# Patient Record
Sex: MALE | Race: WHITE | NOT HISPANIC OR LATINO | Employment: OTHER | URBAN - METROPOLITAN AREA
[De-identification: names, ages, dates, MRNs, and addresses within clinical notes are randomized per-mention and may not be internally consistent; named-entity substitution may affect disease eponyms.]

---

## 2023-03-23 ENCOUNTER — LAB (OUTPATIENT)
Dept: LAB | Facility: HOSPITAL | Age: 68
End: 2023-03-23
Payer: MEDICARE

## 2023-03-23 ENCOUNTER — OFFICE VISIT (OUTPATIENT)
Dept: GASTROENTEROLOGY | Facility: CLINIC | Age: 68
End: 2023-03-23
Payer: MEDICARE

## 2023-03-23 VITALS — BODY MASS INDEX: 27.77 KG/M2 | HEIGHT: 70 IN | WEIGHT: 194 LBS

## 2023-03-23 DIAGNOSIS — K74.69 OTHER CIRRHOSIS OF LIVER: Primary | ICD-10-CM

## 2023-03-23 DIAGNOSIS — D12.2 ADENOMATOUS POLYP OF ASCENDING COLON: ICD-10-CM

## 2023-03-23 DIAGNOSIS — K74.69 OTHER CIRRHOSIS OF LIVER: ICD-10-CM

## 2023-03-23 DIAGNOSIS — I85.10 SECONDARY ESOPHAGEAL VARICES WITHOUT BLEEDING: ICD-10-CM

## 2023-03-23 DIAGNOSIS — Z86.19 HISTORY OF HEPATITIS C: ICD-10-CM

## 2023-03-23 LAB
INR PPP: 1.21 (ref 0.84–1.13)
PROTHROMBIN TIME: 15.2 SECONDS (ref 11.4–14.4)

## 2023-03-23 PROCEDURE — 1159F MED LIST DOCD IN RCRD: CPT | Performed by: INTERNAL MEDICINE

## 2023-03-23 PROCEDURE — 82105 ALPHA-FETOPROTEIN SERUM: CPT | Performed by: INTERNAL MEDICINE

## 2023-03-23 PROCEDURE — 80053 COMPREHEN METABOLIC PANEL: CPT

## 2023-03-23 PROCEDURE — 85025 COMPLETE CBC W/AUTO DIFF WBC: CPT | Performed by: INTERNAL MEDICINE

## 2023-03-23 PROCEDURE — 36415 COLL VENOUS BLD VENIPUNCTURE: CPT | Performed by: INTERNAL MEDICINE

## 2023-03-23 PROCEDURE — 1160F RVW MEDS BY RX/DR IN RCRD: CPT | Performed by: INTERNAL MEDICINE

## 2023-03-23 PROCEDURE — 85610 PROTHROMBIN TIME: CPT | Performed by: INTERNAL MEDICINE

## 2023-03-23 PROCEDURE — 99204 OFFICE O/P NEW MOD 45 MIN: CPT | Performed by: INTERNAL MEDICINE

## 2023-03-23 RX ORDER — BUSPIRONE HYDROCHLORIDE 10 MG/1
10 TABLET ORAL 3 TIMES DAILY
COMMUNITY

## 2023-03-23 RX ORDER — FERROUS SULFATE 325(65) MG
325 TABLET ORAL
COMMUNITY

## 2023-03-23 RX ORDER — PANTOPRAZOLE SODIUM 40 MG/1
40 TABLET, DELAYED RELEASE ORAL DAILY
COMMUNITY

## 2023-03-23 RX ORDER — SODIUM ZIRCONIUM CYCLOSILICATE 10 G/10G
10 POWDER, FOR SUSPENSION ORAL
COMMUNITY

## 2023-03-23 RX ORDER — ZOLPIDEM TARTRATE 10 MG/1
5 TABLET ORAL NIGHTLY PRN
COMMUNITY

## 2023-03-23 RX ORDER — AMLODIPINE BESYLATE 10 MG/1
10 TABLET ORAL DAILY
COMMUNITY

## 2023-03-23 RX ORDER — FUROSEMIDE 80 MG
80 TABLET ORAL 2 TIMES DAILY
COMMUNITY

## 2023-03-23 RX ORDER — CARVEDILOL 12.5 MG/1
TABLET ORAL
COMMUNITY
Start: 2023-03-20

## 2023-03-23 RX ORDER — GLYBURIDE 5 MG/1
5 TABLET ORAL
COMMUNITY

## 2023-03-23 NOTE — PROGRESS NOTES
PCP:  Provider, No Known     Marck Cueva, [address] [address]    Chief Complaint   Patient presents with   • Cirrhosis     New pt. Cirrhosis        HPI   The patient is a 67-year-old with a history of cirrhosis who has moved from [place].  He presumably has cirrhosis from history of hepatitis C which has been treated and eradicated.  As a young man he was a heavy drinker as well.  He has had a previous gastroenterologist who has followed him closely.  He has never had a large GI bleed.  He does have dark stools at times.  He has coughed up a little blood but clearly was coughing and not vomiting.  He has had an extensive GI work-up several times in the past.  This is included multiple upper endoscopies as well as colonoscopies.  He also has had a capsule endoscopy.  He is overdue for ultrasound of the liver as well as blood work.  He is overdue for an alpha-fetoprotein.  It sounds like he may be overdue for both an upper endoscopy to evaluate his varices as well as a colonoscopy given his history of polyps.  He has even had an ERCP in the past on 12/17/2018 and had a common duct stone removed.  He has no family history of colon polyps or cancers.  He does have a history of kidney stones, cholecystectomy for stones, tonsillectomy, diabetes, renal failure, hypertension, and hepatitis C as mentioned.  He also has a history of cirrhosis.  He denies any confusion or difficulties with his thinking.  He has had ascites in the past.  He is on Lasix twice daily.    He did have an ERCP as mentioned 12/17/2018 for stones.  It looks like he had an upper and lower endoscopy in February 2019.  This showed esophageal varices which were small.  They flattened with insufflation.  Colonoscopy showed a small sessile polyp in the ascending colon that was removed.  Biopsies in the stomach showed gastritis.  There was no H. pylori.  Biopsies in the colon showed hyperplastic polyp.      He had a  repeat EGD on 4/23/2021.  He had 3 columns of grade 1-2 varices.  One of them had a mucosal erosion but was not actively bleeding.  He had a Schatzki's ring.  He he did elect to place 3 bands at that time.  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  He had a repeat EGD 8/30/2021.  The varices were thought to be small and flattened with insufflation.  There was scarring from the previous banding.  He had a widely patent Schatzki's ring.  Again there was some striping in the antrum thought to possibly represent gastric antral vascular ectasia.  He had a colonoscopy 9/17/2021 which showed a 20 mm polyp in the ascending colon removed in a piecemeal fashion.  There were 3 small sessile polyps in the rectum.  There was a sigmoid polyp removed as well.  The rectal polyps were hyperplastic.  The sigmoid polyp was a tubulovillous adenoma.  The right colon polyp was a sessile serrated polyp.  He had a capsule endoscopy 11/22/2021 which showed a few punctate areas of erythema scattered in the small bowel.  There was no active bleeding.  The colonoscopy showed small sessile polyps in the sigmoid colon.  It looks like he wanted to do a repeat colonoscopy in a year from his last evaluation.  The polyps in 2021 were hyperplastic.  He does have an appointment at [facility] April [facility].    Allergies   Allergen Reactions   • Biaxin [Clarithromycin] Hives          Current Outpatient Medications:   •  amLODIPine (NORVASC) 10 MG tablet, Take 1 tablet by mouth Daily., Disp: , Rfl:   •  busPIRone (BUSPAR) 10 MG tablet, Take 1 tablet by mouth 3 (Three) Times a Day., Disp: , Rfl:   •  carvedilol (COREG) 12.5 MG tablet, , Disp: , Rfl:   •  ferrous sulfate 325 (65 FE) MG tablet, Take 1 tablet by mouth Daily With Breakfast., Disp: , Rfl:   •  furosemide (LASIX) 80 MG tablet, Take 1 tablet by mouth 2 (Two) Times a Day., Disp: , Rfl:   •  glyburide (DIAbeta) 5 MG tablet, Take 1  tablet by mouth Daily With Breakfast., Disp: , Rfl:   •  pantoprazole (PROTONIX) 40 MG EC tablet, Take 1 tablet by mouth Daily., Disp: , Rfl:   •  sodium zirconium cyclosilicate (Lokelma) 10 g pack, Take 10 g by mouth., Disp: , Rfl:   •  zolpidem (AMBIEN) 10 MG tablet, Take 5 mg by mouth At Night As Needed for Sleep., Disp: , Rfl:      Past Medical History:   Diagnosis Date   • Cirrhosis (HCC)    • Diabetes mellitus (HCC)    • Hyperlipidemia    • Hypertension        Past Surgical History:   Procedure Laterality Date   • CHOLECYSTECTOMY     • TONSILLECTOMY          Social History     Socioeconomic History   • Marital status:    Tobacco Use   • Smoking status: Never   Substance and Sexual Activity   • Alcohol use: Never   • Drug use: Never        History reviewed. No pertinent family history.     Review of Systems     There were no vitals filed for this visit.     Physical Exam  Constitutional:       General: He is not in acute distress.     Appearance: Normal appearance. He is not ill-appearing.   Abdominal:      General: There is no distension.      Palpations: Abdomen is soft. There is no mass.      Tenderness: There is no abdominal tenderness. There is no guarding.   Skin:     Coloration: Skin is not jaundiced.      Findings: No rash.   Neurological:      General: No focal deficit present.      Mental Status: He is alert and oriented to person, place, and time.     I do not see any spider angiomas or palmar erythema.  There is no asterixis.  There is no obvious hepatic encephalopathy.    Diagnoses and all orders for this visit:    1. Other cirrhosis of liver (HCC) (Primary)  -     Protime-INR  -     CBC & Differential  -     Comprehensive Metabolic Panel; Future  -     AFP Tumor Marker    2. Adenomatous polyp of ascending colon    3. Secondary esophageal varices without bleeding (HCC)    4. History of hepatitis C    Impressions and plan #1 cirrhosis: He seems to be stable at the moment.  I will check blood  work to get an idea of his liver chemistries, albumin, PT, and platelet count.  We will look at his hemoglobin and hematocrit as well.  I will check an alpha-fetoprotein.  I am going to check an ultrasound of the right upper quadrant.  Ultimately he is likely going to need an upper endoscopy.  He may well need a colonoscopy as well.  I like to get the blood work first as he is somewhat complicated and I want to get a better feel before jumping into any procedures.  He seems to be quite stable and has no signs of encephalopathy.    Pieter Lundberg MD

## 2023-03-24 LAB
ALBUMIN SERPL-MCNC: 3.5 G/DL (ref 3.5–5.2)
ALBUMIN/GLOB SERPL: 1.1 G/DL
ALP SERPL-CCNC: 175 U/L (ref 39–117)
ALPHA-FETOPROTEIN: 4.13 NG/ML (ref 0–8.3)
ALT SERPL W P-5'-P-CCNC: 20 U/L (ref 1–41)
ANION GAP SERPL CALCULATED.3IONS-SCNC: 13 MMOL/L (ref 5–15)
AST SERPL-CCNC: 29 U/L (ref 1–40)
BASOPHILS # BLD AUTO: 0.07 10*3/MM3 (ref 0–0.2)
BASOPHILS NFR BLD AUTO: 1.2 % (ref 0–1.5)
BILIRUB SERPL-MCNC: 0.3 MG/DL (ref 0–1.2)
BUN SERPL-MCNC: 67 MG/DL (ref 8–23)
BUN/CREAT SERPL: 19.5 (ref 7–25)
CALCIUM SPEC-SCNC: 8 MG/DL (ref 8.6–10.5)
CHLORIDE SERPL-SCNC: 113 MMOL/L (ref 98–107)
CO2 SERPL-SCNC: 20 MMOL/L (ref 22–29)
CREAT SERPL-MCNC: 3.44 MG/DL (ref 0.76–1.27)
DEPRECATED RDW RBC AUTO: 43.7 FL (ref 37–54)
EGFRCR SERPLBLD CKD-EPI 2021: 19 ML/MIN/1.73
EOSINOPHIL # BLD AUTO: 0.82 10*3/MM3 (ref 0–0.4)
EOSINOPHIL NFR BLD AUTO: 13.5 % (ref 0.3–6.2)
ERYTHROCYTE [DISTWIDTH] IN BLOOD BY AUTOMATED COUNT: 13.4 % (ref 12.3–15.4)
GLOBULIN UR ELPH-MCNC: 3.2 GM/DL
GLUCOSE SERPL-MCNC: 118 MG/DL (ref 65–99)
HCT VFR BLD AUTO: 24.5 % (ref 37.5–51)
HGB BLD-MCNC: 8.3 G/DL (ref 13–17.7)
IMM GRANULOCYTES # BLD AUTO: 0.02 10*3/MM3 (ref 0–0.05)
IMM GRANULOCYTES NFR BLD AUTO: 0.3 % (ref 0–0.5)
LYMPHOCYTES # BLD AUTO: 0.71 10*3/MM3 (ref 0.7–3.1)
LYMPHOCYTES NFR BLD AUTO: 11.7 % (ref 19.6–45.3)
MCH RBC QN AUTO: 30.2 PG (ref 26.6–33)
MCHC RBC AUTO-ENTMCNC: 33.9 G/DL (ref 31.5–35.7)
MCV RBC AUTO: 89.1 FL (ref 79–97)
MONOCYTES # BLD AUTO: 0.45 10*3/MM3 (ref 0.1–0.9)
MONOCYTES NFR BLD AUTO: 7.4 % (ref 5–12)
NEUTROPHILS NFR BLD AUTO: 3.99 10*3/MM3 (ref 1.7–7)
NEUTROPHILS NFR BLD AUTO: 65.9 % (ref 42.7–76)
NRBC BLD AUTO-RTO: 0 /100 WBC (ref 0–0.2)
PLATELET # BLD AUTO: 132 10*3/MM3 (ref 140–450)
PMV BLD AUTO: 12.2 FL (ref 6–12)
POTASSIUM SERPL-SCNC: 4.5 MMOL/L (ref 3.5–5.2)
PROT SERPL-MCNC: 6.7 G/DL (ref 6–8.5)
RBC # BLD AUTO: 2.75 10*6/MM3 (ref 4.14–5.8)
SODIUM SERPL-SCNC: 146 MMOL/L (ref 136–145)
WBC NRBC COR # BLD: 6.06 10*3/MM3 (ref 3.4–10.8)

## 2023-05-01 ENCOUNTER — HOSPITAL ENCOUNTER (OUTPATIENT)
Dept: ULTRASOUND IMAGING | Facility: HOSPITAL | Age: 68
Discharge: HOME | End: 2023-05-01
Admitting: INTERNAL MEDICINE
Payer: MEDICARE

## 2023-05-01 DIAGNOSIS — K74.69 OTHER CIRRHOSIS OF LIVER: ICD-10-CM

## 2023-05-01 PROCEDURE — 76705 ECHO EXAM OF ABDOMEN: CPT

## 2023-06-06 ENCOUNTER — OFFICE VISIT (OUTPATIENT)
Dept: GASTROENTEROLOGY | Facility: CLINIC | Age: 68
End: 2023-06-06
Payer: MEDICARE

## 2023-06-06 VITALS
BODY MASS INDEX: 28.63 KG/M2 | SYSTOLIC BLOOD PRESSURE: 140 MMHG | WEIGHT: 200 LBS | HEART RATE: 48 BPM | RESPIRATION RATE: 18 BRPM | DIASTOLIC BLOOD PRESSURE: 52 MMHG | HEIGHT: 70 IN

## 2023-06-06 DIAGNOSIS — D12.2 ADENOMATOUS POLYP OF ASCENDING COLON: ICD-10-CM

## 2023-06-06 DIAGNOSIS — Z86.19 HISTORY OF HEPATITIS C: ICD-10-CM

## 2023-06-06 DIAGNOSIS — I85.10 SECONDARY ESOPHAGEAL VARICES WITHOUT BLEEDING: ICD-10-CM

## 2023-06-06 DIAGNOSIS — K74.69 OTHER CIRRHOSIS OF LIVER: Primary | ICD-10-CM

## 2023-06-06 PROCEDURE — 99214 OFFICE O/P EST MOD 30 MIN: CPT | Performed by: INTERNAL MEDICINE

## 2023-06-06 PROCEDURE — 1160F RVW MEDS BY RX/DR IN RCRD: CPT | Performed by: INTERNAL MEDICINE

## 2023-06-06 PROCEDURE — 1159F MED LIST DOCD IN RCRD: CPT | Performed by: INTERNAL MEDICINE

## 2023-06-06 RX ORDER — GLIPIZIDE 5 MG/1
TABLET, FILM COATED, EXTENDED RELEASE ORAL
COMMUNITY
Start: 2023-05-25

## 2023-06-06 NOTE — PROGRESS NOTES
PCP:  Pieter Paula MD     No referring provider defined for this encounter.    Chief Complaint   Patient presents with    Cirrhosis     Follow up Cirrhosis        HPI   ***    Allergies   Allergen Reactions    Biaxin [Clarithromycin] Hives          Current Outpatient Medications:     glipizide (GLUCOTROL XL) 5 MG ER tablet, TAKE 1 TABLET BY MOUTH IN THE MORNING 30 MINUTES BEFORE A MEAL, Disp: , Rfl:     amLODIPine (NORVASC) 10 MG tablet, Take 1 tablet by mouth Daily., Disp: , Rfl:     busPIRone (BUSPAR) 10 MG tablet, Take 1 tablet by mouth 3 (Three) Times a Day., Disp: , Rfl:     carvedilol (COREG) 12.5 MG tablet, , Disp: , Rfl:     ferrous sulfate 325 (65 FE) MG tablet, Take 1 tablet by mouth Daily With Breakfast., Disp: , Rfl:     furosemide (LASIX) 80 MG tablet, Take 1 tablet by mouth 2 (Two) Times a Day., Disp: , Rfl:     glyburide (DIAbeta) 5 MG tablet, Take 1 tablet by mouth Daily With Breakfast., Disp: , Rfl:     pantoprazole (PROTONIX) 40 MG EC tablet, Take 1 tablet by mouth Daily., Disp: , Rfl:     sodium zirconium cyclosilicate (Lokelma) 10 g pack, Take 10 g by mouth., Disp: , Rfl:     zolpidem (AMBIEN) 10 MG tablet, Take 5 mg by mouth At Night As Needed for Sleep., Disp: , Rfl:      Past Medical History:   Diagnosis Date    Cirrhosis     Diabetes mellitus     Hyperlipidemia     Hypertension        Past Surgical History:   Procedure Laterality Date    CHOLECYSTECTOMY      TONSILLECTOMY          Social History     Socioeconomic History    Marital status:    Tobacco Use    Smoking status: Never   Substance and Sexual Activity    Alcohol use: Never    Drug use: Never        No family history on file.     Review of Systems     Vitals:    06/06/23 1404   BP: 140/52   Pulse: (!) 48   Resp: 18        Physical Exam     There are no diagnoses linked to this encounter.     Lilliana Lei MA

## 2023-06-06 NOTE — PROGRESS NOTES
PCP:  Pieter Paula MD     No referring provider defined for this encounter.    Chief Complaint   Patient presents with    Cirrhosis     Follow up Cirrhosis        HPI   The patient is a 67-year-old gentleman known to me with a history of cirrhosis.  He is following concurrently with [facility].  He had a recent alpha-fetoprotein level which was normal.  This was on 4/4/2023.  He had a hepatitis viral RNA by PCR which was negative.  He had a colonoscopy 4/4/2023 which showed hyperplastic polyps.  He had an upper endoscopy as well which he states showed watermelon stomach and fairly well eradicated varices but I do not have that report.  He had an ultrasound done which showed a coarsened echotexture of the liver and enlarged spleen but no mass that I can determine.  He apparently is been diagnosed with renal insufficiency and is seeing a nephrologist Dr. Corral at [facility].    Allergies   Allergen Reactions    Biaxin [Clarithromycin] Hives          Current Outpatient Medications:     glipizide (GLUCOTROL XL) 5 MG ER tablet, TAKE 1 TABLET BY MOUTH IN THE MORNING 30 MINUTES BEFORE A MEAL, Disp: , Rfl:     amLODIPine (NORVASC) 10 MG tablet, Take 1 tablet by mouth Daily., Disp: , Rfl:     busPIRone (BUSPAR) 10 MG tablet, Take 1 tablet by mouth 3 (Three) Times a Day., Disp: , Rfl:     carvedilol (COREG) 12.5 MG tablet, , Disp: , Rfl:     ferrous sulfate 325 (65 FE) MG tablet, Take 1 tablet by mouth Daily With Breakfast., Disp: , Rfl:     furosemide (LASIX) 80 MG tablet, Take 1 tablet by mouth 2 (Two) Times a Day., Disp: , Rfl:     glyburide (DIAbeta) 5 MG tablet, Take 1 tablet by mouth Daily With Breakfast., Disp: , Rfl:     pantoprazole (PROTONIX) 40 MG EC tablet, Take 1 tablet by mouth Daily., Disp: , Rfl:     sodium zirconium cyclosilicate (Lokelma) 10 g pack, Take 10 g by mouth., Disp: , Rfl:     zolpidem (AMBIEN) 10 MG tablet, Take 5 mg by mouth At Night As Needed for Sleep., Disp: , Rfl:      Past Medical  History:   Diagnosis Date    Cirrhosis     Diabetes mellitus     Hyperlipidemia     Hypertension        Past Surgical History:   Procedure Laterality Date    CHOLECYSTECTOMY      TONSILLECTOMY          Social History     Socioeconomic History    Marital status:    Tobacco Use    Smoking status: Never   Substance and Sexual Activity    Alcohol use: Never    Drug use: Never        History reviewed. No pertinent family history.     Review of Systems     Vitals:    06/06/23 1404   BP: 140/52   Pulse: (!) 48   Resp: 18        Physical Exam     Impression diagnoses and all orders for this visit:    1. Other cirrhosis of liver (Primary)    2. Adenomatous polyp of ascending colon    3. Secondary esophageal varices without bleeding    4. History of hepatitis C    Impressions and plan #1 cirrhosis of liver: He had imaging and an alpha-fetoprotein level.  He has been followed at .  I told him that he probably does need to have both of us following at this point but I am available for him at any time.  I will make a follow-up in 6 months although he can cancel if he is having close follow-up at .    #2 history of hepatitis C: It looks like his hepatitis C has been eradicated.  This is excellent news.    #3 history of adenomatous polyps of the colon: He had a recent colonoscopy and only hyperplastic polyps removed.  This was done 4/21/2023.      #4 history of varices and apparently watermelon stomach: He follows with  from that standpoint but is is doing well from that point of view      #5 history of renal insufficiency: He is being considered for dialysis.      Pieter Lundberg MD

## 2023-06-06 NOTE — LETTER
June 6, 2023       No Recipients    Patient: Dayron Pinto   YOB: 1955   Date of Visit: 6/6/2023       Dear Dr. Amador Recipients:    Thank you for referring Dayron Pinto to me for evaluation. Below are the relevant portions of my assessment and plan of care.    If you have questions, please do not hesitate to call me. I look forward to following Dayron along with you.         Sincerely,        Peiter Lundberg MD        CC:   No Recipients    Pieter Lundberg MD  06/06/23 1436  Sign when Signing Visit  PCP:  Pieter Paula MD     No referring provider defined for this encounter.    Chief Complaint   Patient presents with   • Cirrhosis     Follow up Cirrhosis        HPI   The patient is a 67-year-old gentleman known to me with a history of cirrhosis.  He is following concurrently with [facility].  He had a recent alpha-fetoprotein level which was normal.  This was on 4/4/2023.  He had a hepatitis viral RNA by PCR which was negative.  He had a colonoscopy 4/4/2023 which showed hyperplastic polyps.  He had an upper endoscopy as well which he states showed watermelon stomach and fairly well eradicated varices but I do not have that report.  He had an ultrasound done which showed a coarsened echotexture of the liver and enlarged spleen but no mass that I can determine.  He apparently is been diagnosed with renal insufficiency and is seeing a nephrologist Dr. Corral at [facility].    Allergies   Allergen Reactions   • Biaxin [Clarithromycin] Hives          Current Outpatient Medications:   •  glipizide (GLUCOTROL XL) 5 MG ER tablet, TAKE 1 TABLET BY MOUTH IN THE MORNING 30 MINUTES BEFORE A MEAL, Disp: , Rfl:   •  amLODIPine (NORVASC) 10 MG tablet, Take 1 tablet by mouth Daily., Disp: , Rfl:   •  busPIRone (BUSPAR) 10 MG tablet, Take 1 tablet by mouth 3 (Three) Times a Day., Disp: , Rfl:   •  carvedilol (COREG) 12.5 MG tablet, , Disp: , Rfl:   •  ferrous sulfate 325 (65 FE) MG tablet, Take 1  tablet by mouth Daily With Breakfast., Disp: , Rfl:   •  furosemide (LASIX) 80 MG tablet, Take 1 tablet by mouth 2 (Two) Times a Day., Disp: , Rfl:   •  glyburide (DIAbeta) 5 MG tablet, Take 1 tablet by mouth Daily With Breakfast., Disp: , Rfl:   •  pantoprazole (PROTONIX) 40 MG EC tablet, Take 1 tablet by mouth Daily., Disp: , Rfl:   •  sodium zirconium cyclosilicate (Lokelma) 10 g pack, Take 10 g by mouth., Disp: , Rfl:   •  zolpidem (AMBIEN) 10 MG tablet, Take 5 mg by mouth At Night As Needed for Sleep., Disp: , Rfl:      Past Medical History:   Diagnosis Date   • Cirrhosis    • Diabetes mellitus    • Hyperlipidemia    • Hypertension        Past Surgical History:   Procedure Laterality Date   • CHOLECYSTECTOMY     • TONSILLECTOMY          Social History     Socioeconomic History   • Marital status:    Tobacco Use   • Smoking status: Never   Substance and Sexual Activity   • Alcohol use: Never   • Drug use: Never        History reviewed. No pertinent family history.     Review of Systems     Vitals:    06/06/23 1404   BP: 140/52   Pulse: (!) 48   Resp: 18        Physical Exam     Impression diagnoses and all orders for this visit:    1. Other cirrhosis of liver (Primary)    2. Adenomatous polyp of ascending colon    3. Secondary esophageal varices without bleeding    4. History of hepatitis C    Impressions and plan #1 cirrhosis of liver: He had imaging and an alpha-fetoprotein level.  He has been followed at .  I told him that he probably does need to have both of us following at this point but I am available for him at any time.  I will make a follow-up in 6 months although he can cancel if he is having close follow-up at .    #2 history of hepatitis C: It looks like his hepatitis C has been eradicated.  This is excellent news.    #3 history of adenomatous polyps of the colon: He had a recent colonoscopy and only hyperplastic polyps removed.  This was done 4/21/2023.      #4 history of varices and  apparently watermelon stomach: He follows with  from that standpoint but is is doing well from that point of view      #5 history of renal insufficiency: He is being considered for dialysis.      Pieter Lundberg MD

## 2024-11-19 ENCOUNTER — TRANSCRIBE ORDERS (OUTPATIENT)
Dept: GENERAL RADIOLOGY | Facility: HOSPITAL | Age: 69
End: 2024-11-19
Payer: MEDICARE

## 2024-11-19 ENCOUNTER — HOSPITAL ENCOUNTER (OUTPATIENT)
Dept: GENERAL RADIOLOGY | Facility: HOSPITAL | Age: 69
Discharge: HOME | End: 2024-11-19
Admitting: PHYSICIAN ASSISTANT
Payer: MEDICARE

## 2024-11-19 DIAGNOSIS — M25.9: ICD-10-CM

## 2024-11-19 DIAGNOSIS — M25.9: Primary | ICD-10-CM

## 2024-11-19 PROCEDURE — 73030 X-RAY EXAM OF SHOULDER: CPT
